# Patient Record
Sex: MALE | Race: OTHER | HISPANIC OR LATINO | URBAN - METROPOLITAN AREA
[De-identification: names, ages, dates, MRNs, and addresses within clinical notes are randomized per-mention and may not be internally consistent; named-entity substitution may affect disease eponyms.]

---

## 2019-01-11 ENCOUNTER — OFFICE (OUTPATIENT)
Dept: URBAN - METROPOLITAN AREA CLINIC 78 | Facility: CLINIC | Age: 39
End: 2019-01-11
Payer: MEDICAID

## 2019-01-11 VITALS
HEIGHT: 67 IN | HEART RATE: 72 BPM | WEIGHT: 159 LBS | DIASTOLIC BLOOD PRESSURE: 65 MMHG | SYSTOLIC BLOOD PRESSURE: 110 MMHG | TEMPERATURE: 98.2 F

## 2019-01-11 DIAGNOSIS — R12 HEARTBURN: ICD-10-CM

## 2019-01-11 DIAGNOSIS — R63.4 ABNORMAL WEIGHT LOSS: ICD-10-CM

## 2019-01-11 DIAGNOSIS — R11.10 VOMITING, UNSPECIFIED: ICD-10-CM

## 2019-01-11 DIAGNOSIS — K59.09 OTHER CONSTIPATION: ICD-10-CM

## 2019-01-11 DIAGNOSIS — R13.10 DYSPHAGIA, UNSPECIFIED: ICD-10-CM

## 2019-01-11 PROCEDURE — 99203 OFFICE O/P NEW LOW 30 MIN: CPT

## 2019-01-11 RX ORDER — LUBIPROSTONE 24 UG/1
CAPSULE, GELATIN COATED ORAL
Qty: 30 | Refills: 0 | Status: COMPLETED
Start: 2019-01-11 | End: 2019-02-25

## 2019-01-11 NOTE — SERVICEHPINOTES
BETITO CENTENO   is a   38   year old male who is being seen in consultation at the request of   LOVE SETHI   for dysphagia onset in 11/2018 and chronic GERD. He recalls initial episode of dysphagia in 11/2018 with difficulty passing "ravioli" and ever since then is very anxious so blending all his foods. He reports dysphagia felt over lower throat to mid sternum, occurs daily, happens with solids more than liquids. He takes Protonix 40 mg q AM x 1 months so far has not made any improvement. He notes asso sx include: regurgitation and heartburn. He has lost 20 lbs since November due to eating less secondary to dysphagia. Prior EGD 2014 in [place] found gastritis/esophagitis and started on a PPI that improved his reflux at that time. He has chronic constipation manifesting with BMs every 2 days and BSS type 3 with a lot of straining. He was started on linzess 145 mcg that caused diarrhea so stopped x 1 week ago. He had a colonoscopy in 2013 to evaluate his constipation that was "normal." He recently moved from [place] to [place] in mid 11/2018. He was seen by [facility] in GI but his EGD cancelled due to not accepting Medicaid. Denies  chest pain, dyspnea at rest, fevers, n/v, melena, blood in stool, rectal bleeding, change in bowel habits.

## 2019-02-13 ENCOUNTER — ON CAMPUS - OUTPATIENT (OUTPATIENT)
Dept: URBAN - METROPOLITAN AREA HOSPITAL 35 | Facility: HOSPITAL | Age: 39
End: 2019-02-13
Payer: MEDICAID

## 2019-02-13 DIAGNOSIS — R13.10 DYSPHAGIA, UNSPECIFIED: ICD-10-CM

## 2019-02-13 DIAGNOSIS — R07.89 OTHER CHEST PAIN: ICD-10-CM

## 2019-02-13 DIAGNOSIS — K21.9 GASTRO-ESOPHAGEAL REFLUX DISEASE WITHOUT ESOPHAGITIS: ICD-10-CM

## 2019-02-13 DIAGNOSIS — R63.4 ABNORMAL WEIGHT LOSS: ICD-10-CM

## 2019-02-13 DIAGNOSIS — K29.60 OTHER GASTRITIS WITHOUT BLEEDING: ICD-10-CM

## 2019-02-13 PROCEDURE — 43239 EGD BIOPSY SINGLE/MULTIPLE: CPT

## 2019-02-25 ENCOUNTER — OFFICE (OUTPATIENT)
Dept: URBAN - METROPOLITAN AREA CLINIC 78 | Facility: CLINIC | Age: 39
End: 2019-02-25
Payer: MEDICAID

## 2019-02-25 VITALS
TEMPERATURE: 98.6 F | HEIGHT: 67 IN | HEART RATE: 62 BPM | WEIGHT: 153 LBS | SYSTOLIC BLOOD PRESSURE: 115 MMHG | DIASTOLIC BLOOD PRESSURE: 68 MMHG

## 2019-02-25 DIAGNOSIS — K21.0 GASTRO-ESOPHAGEAL REFLUX DISEASE WITH ESOPHAGITIS: ICD-10-CM

## 2019-02-25 DIAGNOSIS — K59.09 OTHER CONSTIPATION: ICD-10-CM

## 2019-02-25 DIAGNOSIS — R10.30 LOWER ABDOMINAL PAIN, UNSPECIFIED: ICD-10-CM

## 2019-02-25 DIAGNOSIS — R13.10 DYSPHAGIA, UNSPECIFIED: ICD-10-CM

## 2019-02-25 DIAGNOSIS — K29.70 GASTRITIS, UNSPECIFIED, WITHOUT BLEEDING: ICD-10-CM

## 2019-02-25 PROCEDURE — 99214 OFFICE O/P EST MOD 30 MIN: CPT

## 2019-02-25 RX ORDER — PANTOPRAZOLE SODIUM 40 MG/1
TABLET, DELAYED RELEASE ORAL
Qty: 20 | Refills: 11 | Status: COMPLETED
Start: 2019-02-25 | End: 2019-02-25

## 2019-02-25 NOTE — SERVICEHPINOTES
BETITO CENTENO   is a   38   male who is here for f/u to EGD ordered due to dysphagia. He has taking Protonix 40 mg qd for 1 month so far that has resolved his dysphagia. He states he is no longer afraid to eat and gaining weight. Reviewed EGD finding esophagitis and gastritis with benign biopsies.   He reports lower abdominal pain that is "constant" at baseline described as discomfort and at times feels like "pressure" This discomfort is present daily with increased pain when bending forward, heavy lifting, and prolonged time sitting down while driving long distance.  He has chronic constipation and no improvement on Amitiza so stopped. Prior use of Linzess rx'ed by previous GI doctor in [place] caused diarrhea so it was stopped. BRHe has BMs qod BSS type 3 to 4 with straining that is painful and notes BRBPR seen on wipe when he is very strained. He feels mild irritation to anus that he attributes to hemorrhoids. No fm h/o CRC or IBD. Denies diarrhea, change in bowel habits, blood in stool, weight loss.   TARUN

## 2020-01-27 ENCOUNTER — OFFICE (OUTPATIENT)
Dept: URBAN - METROPOLITAN AREA CLINIC 78 | Facility: CLINIC | Age: 40
End: 2020-01-27

## 2020-01-27 VITALS
DIASTOLIC BLOOD PRESSURE: 72 MMHG | WEIGHT: 174 LBS | TEMPERATURE: 97.7 F | HEIGHT: 67 IN | HEART RATE: 78 BPM | SYSTOLIC BLOOD PRESSURE: 126 MMHG

## 2020-01-27 DIAGNOSIS — K59.09 OTHER CONSTIPATION: ICD-10-CM

## 2020-01-27 DIAGNOSIS — K62.5 HEMORRHAGE OF ANUS AND RECTUM: ICD-10-CM

## 2020-01-27 DIAGNOSIS — R10.30 LOWER ABDOMINAL PAIN, UNSPECIFIED: ICD-10-CM

## 2020-01-27 PROCEDURE — 99214 OFFICE O/P EST MOD 30 MIN: CPT

## 2020-01-27 RX ORDER — LINACLOTIDE 72 UG/1
CAPSULE, GELATIN COATED ORAL
Qty: 30 | Refills: 0 | Status: COMPLETED
Start: 2020-01-27 | End: 2022-06-06

## 2020-01-27 NOTE — SERVICEHPINOTES
BETITO CNETENO   is a   38 yo white  male who complains of ongoing right sided abdominal pain that is described as "pressure" sensation, present almost daily, worse when bending over or twisting motions, episodes lasting several hours, and no worse with po intake. He has h/o chronic constipation manifesting with BMs every 2 to 3 days, BSS type 2 to 3 predominately with straining. Prior use of Linzess rx'ed by previous GI doctor in [place] caused diarrhea so it was stopped. Most recent use of Amitiza few months ago made no difference. He also feels mild irritation to anus and BRBPR that he attributes to hemorrhoids. No fm h/o CRC or IBD. Prior colonoscopy in 2014 [place] that was "normal" per patient report. He is on Protonix 20 mg BID due to gastritis Prior EGD 02/2019 benign bx. He has a lot of anxiety. Denies n/v, abdominal pain, change in bowel habits, diarrhea, melena, blood in stool, weight loss. BR

## 2020-02-05 ENCOUNTER — OFFICE (OUTPATIENT)
Dept: URBAN - METROPOLITAN AREA CLINIC 32 | Facility: CLINIC | Age: 40
End: 2020-02-05

## 2020-02-05 VITALS
HEART RATE: 92 BPM | SYSTOLIC BLOOD PRESSURE: 119 MMHG | SYSTOLIC BLOOD PRESSURE: 122 MMHG | HEART RATE: 111 BPM | RESPIRATION RATE: 16 BRPM | HEART RATE: 89 BPM | DIASTOLIC BLOOD PRESSURE: 67 MMHG | DIASTOLIC BLOOD PRESSURE: 59 MMHG | SYSTOLIC BLOOD PRESSURE: 106 MMHG | SYSTOLIC BLOOD PRESSURE: 118 MMHG | DIASTOLIC BLOOD PRESSURE: 54 MMHG | DIASTOLIC BLOOD PRESSURE: 50 MMHG | OXYGEN SATURATION: 98 % | SYSTOLIC BLOOD PRESSURE: 143 MMHG | TEMPERATURE: 99.3 F | SYSTOLIC BLOOD PRESSURE: 84 MMHG | WEIGHT: 174 LBS | RESPIRATION RATE: 14 BRPM | HEART RATE: 107 BPM | DIASTOLIC BLOOD PRESSURE: 66 MMHG | RESPIRATION RATE: 15 BRPM | DIASTOLIC BLOOD PRESSURE: 75 MMHG | HEART RATE: 125 BPM | HEART RATE: 99 BPM | RESPIRATION RATE: 13 BRPM | HEIGHT: 67 IN | DIASTOLIC BLOOD PRESSURE: 79 MMHG | OXYGEN SATURATION: 100 % | RESPIRATION RATE: 18 BRPM | TEMPERATURE: 98.6 F | HEART RATE: 94 BPM | OXYGEN SATURATION: 97 % | OXYGEN SATURATION: 96 % | HEART RATE: 96 BPM

## 2020-02-05 DIAGNOSIS — K59.09 OTHER CONSTIPATION: ICD-10-CM

## 2020-02-05 DIAGNOSIS — K62.5 HEMORRHAGE OF ANUS AND RECTUM: ICD-10-CM

## 2020-02-05 DIAGNOSIS — R10.31 RIGHT LOWER QUADRANT PAIN: ICD-10-CM

## 2020-02-05 PROCEDURE — 45378 DIAGNOSTIC COLONOSCOPY: CPT

## 2022-06-06 ENCOUNTER — OFFICE (OUTPATIENT)
Dept: URBAN - METROPOLITAN AREA CLINIC 79 | Facility: CLINIC | Age: 42
End: 2022-06-06

## 2022-06-06 VITALS
HEART RATE: 109 BPM | WEIGHT: 159 LBS | TEMPERATURE: 97.5 F | DIASTOLIC BLOOD PRESSURE: 72 MMHG | SYSTOLIC BLOOD PRESSURE: 125 MMHG | HEIGHT: 67 IN

## 2022-06-06 DIAGNOSIS — K62.5 HEMORRHAGE OF ANUS AND RECTUM: ICD-10-CM

## 2022-06-06 DIAGNOSIS — K64.8 OTHER HEMORRHOIDS: ICD-10-CM

## 2022-06-06 DIAGNOSIS — K59.09 OTHER CONSTIPATION: ICD-10-CM

## 2022-06-06 DIAGNOSIS — K58.9 IRRITABLE BOWEL SYNDROME WITHOUT DIARRHEA: ICD-10-CM

## 2022-06-06 DIAGNOSIS — K59.04 CHRONIC IDIOPATHIC CONSTIPATION: ICD-10-CM

## 2022-06-06 PROCEDURE — 99214 OFFICE O/P EST MOD 30 MIN: CPT | Performed by: PHYSICIAN ASSISTANT

## 2022-06-06 RX ORDER — LINACLOTIDE 72 UG/1
CAPSULE, GELATIN COATED ORAL
Qty: 30 | Refills: 11 | Status: ACTIVE
Start: 2022-06-06

## 2022-06-06 NOTE — SERVICEHPINOTES
BETITO CENTENO   is a   41   male who complains of abdominal pain, constipation. He was last seen in 01/2020 for rectal bleeding concern that led to colonoscopy. His 02/2020 colonoscopy found internal hemorrhoids, otherwise normal colon so per op report given handout on banding procedure at that time. He is here with concern about intermittent, BRBPR seen on wipe Events about   x/week. He also has chronic constipation with abdominal pain described as "pressure" sensation and relieved with defecation. He reports BMs every 2 to 3 days, BSS type 1 to 2 predominately with straining. He recalls use of lower dose Linzess 72 mcg qd s/p colonoscopy that was effective given daiyl BMs, well formed "soft" but once samples ran out did not pick rx due to high cost (will provided coupon card). Denies n/v, focal abdominal pain, change in bowel habits, diarrhea, melena, blood in stool, weight loss.
br
br Prior CT scan in 2019 before colonoscopy to evaluate his abdominal pain noted stool throughout the colon consistent with h/o chronic constipation.